# Patient Record
Sex: FEMALE | URBAN - METROPOLITAN AREA
[De-identification: names, ages, dates, MRNs, and addresses within clinical notes are randomized per-mention and may not be internally consistent; named-entity substitution may affect disease eponyms.]

---

## 2019-10-07 ENCOUNTER — APPOINTMENT (OUTPATIENT)
Dept: URBAN - METROPOLITAN AREA CLINIC 319 | Age: 24
Setting detail: DERMATOLOGY
End: 2019-10-07

## 2019-10-07 DIAGNOSIS — D485 NEOPLASM OF UNCERTAIN BEHAVIOR OF SKIN: ICD-10-CM

## 2019-10-07 DIAGNOSIS — D22 MELANOCYTIC NEVI: ICD-10-CM

## 2019-10-07 PROBLEM — D48.5 NEOPLASM OF UNCERTAIN BEHAVIOR OF SKIN: Status: ACTIVE | Noted: 2019-10-07

## 2019-10-07 PROBLEM — D22.5 MELANOCYTIC NEVI OF TRUNK: Status: ACTIVE | Noted: 2019-10-07

## 2019-10-07 PROCEDURE — OTHER MIPS QUALITY: OTHER

## 2019-10-07 PROCEDURE — OTHER ADDITIONAL NOTES: OTHER

## 2019-10-07 PROCEDURE — 11102 TANGNTL BX SKIN SINGLE LES: CPT

## 2019-10-07 PROCEDURE — OTHER BIOPSY BY SHAVE METHOD: OTHER

## 2019-10-07 PROCEDURE — OTHER COUNSELING: OTHER

## 2019-10-07 ASSESSMENT — LOCATION DETAILED DESCRIPTION DERM
LOCATION DETAILED: PERIUMBILICAL SKIN
LOCATION DETAILED: RIGHT SUPERIOR MEDIAL MIDBACK
LOCATION DETAILED: RIGHT INFERIOR MEDIAL UPPER BACK
LOCATION DETAILED: RIGHT ANTERIOR PROXIMAL THIGH
LOCATION DETAILED: RIGHT ANTERIOR PROXIMAL THIGH
LOCATION DETAILED: RIGHT SUPERIOR MEDIAL UPPER BACK

## 2019-10-07 ASSESSMENT — LOCATION SIMPLE DESCRIPTION DERM
LOCATION SIMPLE: RIGHT UPPER BACK
LOCATION SIMPLE: RIGHT THIGH
LOCATION SIMPLE: RIGHT THIGH
LOCATION SIMPLE: RIGHT LOWER BACK
LOCATION SIMPLE: ABDOMEN

## 2019-10-07 ASSESSMENT — LOCATION ZONE DERM
LOCATION ZONE: LEG
LOCATION ZONE: TRUNK
LOCATION ZONE: LEG

## 2019-10-07 NOTE — PROCEDURE: BIOPSY BY SHAVE METHOD
Anesthesia Type: 1% lidocaine without epinephrine
Render Path Notes In Note?: No
Anesthesia Volume In Cc (Will Not Render If 0): 1
Notification Instructions: Patient will be notified of biopsy results. However, patient instructed to call the office if not contacted within 2 weeks.
Size Of Lesion In Cm: 0
Post-Care Instructions: I reviewed with the patient in detail post-care instructions. Patient is to keep the biopsy site dry overnight, and then apply bacitracin twice daily until healed. Patient may apply hydrogen peroxide soaks to remove any crusting.
Biopsy Method: scissors
Dressing: bandage
Biopsy Type: H and E
Type Of Destruction Used: Curettage
Consent: Written consent was obtained and risks were reviewed including but not limited to scarring, infection, bleeding, scabbing, incomplete removal, nerve damage and allergy to anesthesia.
Was A Bandage Applied: Yes
Billing Type: Third-Party Bill
Depth Of Biopsy: dermis
Detail Level: Detailed
Hemostasis: Mildred's
Wound Care: Aquaphor

## 2019-10-07 NOTE — PROCEDURE: ADDITIONAL NOTES
Detail Level: Simple
Additional Notes: Let pt know we can remove it if she decides she wants it gone.